# Patient Record
Sex: FEMALE | URBAN - METROPOLITAN AREA
[De-identification: names, ages, dates, MRNs, and addresses within clinical notes are randomized per-mention and may not be internally consistent; named-entity substitution may affect disease eponyms.]

---

## 2022-06-10 ENCOUNTER — TELEPHONE ENCOUNTER (OUTPATIENT)
Dept: URBAN - METROPOLITAN AREA CLINIC 36 | Facility: CLINIC | Age: 42
End: 2022-06-10

## 2022-06-16 ENCOUNTER — OFFICE VISIT (OUTPATIENT)
Dept: URBAN - METROPOLITAN AREA CLINIC 33 | Facility: CLINIC | Age: 42
End: 2022-06-16
Payer: COMMERCIAL

## 2022-06-16 ENCOUNTER — LAB OUTSIDE AN ENCOUNTER (OUTPATIENT)
Dept: URBAN - METROPOLITAN AREA CLINIC 33 | Facility: CLINIC | Age: 42
End: 2022-06-16

## 2022-06-16 VITALS
HEART RATE: 76 BPM | BODY MASS INDEX: 23.07 KG/M2 | WEIGHT: 152.2 LBS | OXYGEN SATURATION: 98 % | HEIGHT: 68 IN | SYSTOLIC BLOOD PRESSURE: 103 MMHG | DIASTOLIC BLOOD PRESSURE: 66 MMHG

## 2022-06-16 DIAGNOSIS — K90.0 CELIAC DISEASE: ICD-10-CM

## 2022-06-16 DIAGNOSIS — R12 HEARTBURN: ICD-10-CM

## 2022-06-16 PROCEDURE — 99203 OFFICE O/P NEW LOW 30 MIN: CPT | Performed by: INTERNAL MEDICINE

## 2022-06-16 PROCEDURE — 99243 OFF/OP CNSLTJ NEW/EST LOW 30: CPT | Performed by: INTERNAL MEDICINE

## 2022-06-16 NOTE — HPI-HEARTBURN
42 year old female presents today for consult of heartburn, onset started about 4 months ago. Patient describes symptoms as burning sensation. She denies symptoms  of globus, sour eructations, bloating/gas, early satiety, changes in appetite, coughing, abdominal/epigastric pain, or changes in bowel habits. Patient denies cardiac work up. Patient states that she has tried Prilosec with some relief of symptoms.  Patient has had an EGD in the past (long ago), denies any recent Barium Swallow Test  GI MD: Uncomfortable feeling in upper esophagus, +heartburn. There is also awareness when swallowing. 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

## 2022-07-20 ENCOUNTER — OFFICE VISIT (OUTPATIENT)
Dept: URBAN - METROPOLITAN AREA SURGERY CENTER 8 | Facility: SURGERY CENTER | Age: 42
End: 2022-07-20

## 2022-07-26 ENCOUNTER — OFFICE VISIT (OUTPATIENT)
Dept: URBAN - METROPOLITAN AREA CLINIC 31 | Facility: CLINIC | Age: 42
End: 2022-07-26

## 2022-08-01 ENCOUNTER — OFFICE VISIT (OUTPATIENT)
Dept: URBAN - METROPOLITAN AREA SURGERY CENTER 8 | Facility: SURGERY CENTER | Age: 42
End: 2022-08-01
Payer: COMMERCIAL

## 2022-08-01 DIAGNOSIS — R12 BURNING REFLUX: ICD-10-CM

## 2022-08-01 DIAGNOSIS — K90.0 ACD (ADULT CELIAC DISEASE): ICD-10-CM

## 2022-08-01 DIAGNOSIS — K31.89 ACQUIRED DEFORMITY OF DUODENUM: ICD-10-CM

## 2022-08-01 DIAGNOSIS — K22.89 DILATATION OF ESOPHAGUS: ICD-10-CM

## 2022-08-01 PROCEDURE — G8907 PT DOC NO EVENTS ON DISCHARG: HCPCS | Performed by: INTERNAL MEDICINE

## 2022-08-01 PROCEDURE — 43239 EGD BIOPSY SINGLE/MULTIPLE: CPT | Performed by: INTERNAL MEDICINE

## 2022-08-16 ENCOUNTER — OFFICE VISIT (OUTPATIENT)
Dept: URBAN - METROPOLITAN AREA CLINIC 35 | Facility: CLINIC | Age: 42
End: 2022-08-16

## 2022-08-23 ENCOUNTER — DASHBOARD ENCOUNTERS (OUTPATIENT)
Age: 42
End: 2022-08-23

## 2022-08-23 ENCOUNTER — OFFICE VISIT (OUTPATIENT)
Dept: URBAN - METROPOLITAN AREA CLINIC 35 | Facility: CLINIC | Age: 42
End: 2022-08-23
Payer: COMMERCIAL

## 2022-08-23 VITALS
SYSTOLIC BLOOD PRESSURE: 104 MMHG | HEART RATE: 78 BPM | DIASTOLIC BLOOD PRESSURE: 63 MMHG | OXYGEN SATURATION: 98 % | BODY MASS INDEX: 22.73 KG/M2 | HEIGHT: 68 IN | WEIGHT: 150 LBS

## 2022-08-23 DIAGNOSIS — K90.0 CELIAC DISEASE: ICD-10-CM

## 2022-08-23 DIAGNOSIS — R12 HEARTBURN: ICD-10-CM

## 2022-08-23 DIAGNOSIS — K44.9 HIATAL HERNIA: ICD-10-CM

## 2022-08-23 PROCEDURE — 99213 OFFICE O/P EST LOW 20 MIN: CPT | Performed by: INTERNAL MEDICINE

## 2022-08-23 NOTE — HPI-TODAY'S VISIT:
Patient presents today for follow up to her EGD. EGD perfomed for heartburn. Also she has a hx of celiac disease and follows a gluten free diet.  Patient had a positive TTG IgA back 7/26/2012. Patient had EGD with Dr. Quiles with small bowel bxs in 2015,  but report of bx's not in system. Since the procedure patient denies dysphagia, globus, changes in appetite, and changes in bowel habits. She is on no meds for DAVID  Findings 8/1/2022: - Z-line regular, 35 cm from the incisors. - 2 cm sliding type hiatal hernia. - Normal mucosa was found in the entire esophagus.  Biopsied. - Erythematous mucosa in the gastric body and antrum.  Biopsied. - Normal examined duodenum.  Biopsied  Pathology 8/1/2022: FINAL DIAGNOSIS: A. Duodenum, Second Part, Biopsy -Benign small bowel mucosa with normal villous pattern. -Negative for intraepithelial lymphocytosis. B. Stomach, Body, Antrum, Biopsy -Benign gastric mucosa with no significant histopathology. -No Helicobacter-like organisms identified on Giemsa stain. C. Esophagus, Lower Third, Biopsy -Benign squamous mucosa with no significant histopathology. -Negative for intestinal metaplasia. -Negative for hypereosinophilia. -Negative for dysplasia or malignancy.

## 2022-09-05 PROBLEM — 396331005 CELIAC DISEASE: Status: ACTIVE | Noted: 2022-09-05

## 2025-07-22 ENCOUNTER — TELEPHONE ENCOUNTER (OUTPATIENT)
Dept: URBAN - METROPOLITAN AREA CLINIC 6 | Facility: CLINIC | Age: 45
End: 2025-07-22